# Patient Record
Sex: FEMALE | Race: ASIAN | URBAN - METROPOLITAN AREA
[De-identification: names, ages, dates, MRNs, and addresses within clinical notes are randomized per-mention and may not be internally consistent; named-entity substitution may affect disease eponyms.]

---

## 2017-05-23 ENCOUNTER — OFFICE VISIT (OUTPATIENT)
Dept: FAMILY MEDICINE | Facility: CLINIC | Age: 52
End: 2017-05-23

## 2017-05-23 VITALS
SYSTOLIC BLOOD PRESSURE: 122 MMHG | TEMPERATURE: 98 F | OXYGEN SATURATION: 98 % | DIASTOLIC BLOOD PRESSURE: 78 MMHG | HEART RATE: 96 BPM | WEIGHT: 112.6 LBS | BODY MASS INDEX: 21.45 KG/M2

## 2017-05-23 DIAGNOSIS — R01.1 HEART MURMUR: ICD-10-CM

## 2017-05-23 DIAGNOSIS — N81.2 INCOMPLETE UTERINE PROLAPSE: Primary | ICD-10-CM

## 2017-05-23 NOTE — PATIENT INSTRUCTIONS
For uterus problem:  1. Someone from our clinic will call you tomorrow to set up appointment with the OB/GYN doctor    Follow up at [facility] after your appointment with GYN      Your referral has been scheduled for:    [facility]   [place] [employer] [address], [address]  [place] [id number]  Phone: [phone]    Date: Tomorrow May 25 2017   Time: 7:00 PM Dr Bell    If you have any questions or need to reschedule please call the number listed above.  Any other concerns please call our referral coordinator at [phone]    [facility]  Care Coordinator     GAVE TO ROBIN Cisneros  [phone]

## 2017-05-23 NOTE — PROGRESS NOTES
"       BASILIO Goetz is a 52 year old  female with a PMH significant for:     Patient Active Problem List   Diagnosis     Refugee     Screening examination for pulmonary tuberculosis (TB)     Lipid screening     Restless legs syndrome (RLS)     Vitamin D deficiency     She presents with uterine concerns.    She thinks her uterus is falling out.  She has had issues with her \"uterus falling out\" for the last 20 plus years.  In the past when this would happen, she would \"push it back in\" and the problem would resolve.  However, 1-2 weeks ago, her uterus prolapsed and she has been unable to reduce it.  She notes that it is painful when she tries to reduce it.  She is still able to void and have bowel movements with the prolapsed uterus.  The bowel movement are painful.  At times she experiences some bleeding, but she doesn't report any issues with bleeding today.  She thinks it is time that her uterus be removed.    She had 4 vaginal births in the past and the prolapse started shortly after the birth of her last child.    She is not currently on any medication.    PMH, Medications and Allergies were reviewed and updated as needed.    ROS as above        OBJECTIVE     Vitals:    05/23/17 1649   BP: 122/78   Pulse: 96   Temp: 98  F (36.7  C)   TempSrc: Oral   SpO2: 98%   Weight: 112 lb 9.6 oz (51.1 kg)     Body mass index is 21.45 kg/(m^2).    General: Alert, appropriate, and cooperative.  Appears stated age.  In no acute distress  HEENT:  Atraumatic.  Pupils equal, round, and reactive bilaterally.  Extraocular movements intact.  Mouth shows moist mucous membranes.    Neck:  Supple.  No adenopathy.    CV:  Regular rhythm and rate.  2/6 systolic murmur heard best over right upper sternal border.  Lungs:  Clear to auscultation bilaterally.  No wheezes, rhonchi, or rales.  Abd:  Soft, non-distended, non-tender.  Bowel sounds present.  No masses or organomegaly to palpation.  : Most of the uterus is visible at " vaginal entroitus.  The cervix is parous and there is no evidence of bleeding.  Uterine prolapsed is reduced with minimal effort, but patient endorses pain with reduction.        ASSESSMENT AND PLAN     (N81.2) Incomplete uterine prolapse  (primary encounter diagnosis)  Comment: Patient with 20 years of intermittent uterine prolapse now with worsening of symptoms for the last 2 weeks with difficulty reducing uterus.  Elimination is still okay, and currently denies bleeding.  Uterus reduced in clinic today.  Patient will need surgical consultation for evaluation for possible hysterectomy.  Plan: Urgent OB/GYN referral for evaluation and management of uterine prolapse.  Will route to referral coordinator to set up appointment in the next 1-2 days.    (R01.1) Heart murmur  Comment: Appreciated in clinic today. Did not bring up to patient, but will need follow up at future visit.  Plan: Follow up murmur at next appointment.      RTC after OB/GYN consult for follow up of uterine prolapse or sooner if develops new or worsening symptoms.    Staffed with Dr. Lynch..    Yashira Mckee MD ([place]) PGY-2  [facility]  5/23/2017

## 2017-05-23 NOTE — MR AVS SNAPSHOT
After Visit Summary   5/23/2017    Primitivo Garcia    MRN: [id number]           Patient Information     Date Of Birth          1965        Visit Information        Provider Department      5/23/2017 4:30 PM Yashira Mckee MD [facility]        Today's Diagnoses     Incomplete uterine prolapse    -  1      Care Instructions    For uterus problem:  1. Someone from our clinic will call you tomorrow to set up appointment with the OB/GYN doctor    Follow up at [facility] after your appointment with GYN        Follow-ups after your visit        Additional Services     OB/GYN REFERRAL       Patient prefers to be called    Reason for Referral: Uterine prolapse      needed: Yes  Language: Nepalese    May leave message on voicemail: Yes    ([place] Only) Referral should be tracked (Yes/No)?                  Future tests that were ordered for you today     Open Future Orders        Priority Expected Expires Ordered    OB/GYN REFERRAL Routine  5/23/2018 5/23/2017            Who to contact     Please call your clinic at [phone] to:    Ask questions about your health    Make or cancel appointments    Discuss your medicines    Learn about your test results    Speak to your doctor   If you have compliments or concerns about an experience at your clinic, or if you wish to file a complaint, please contact [facility] Patient Relations at [phone] or email us at [email or web address]         Additional Information About Your Visit        MyChart Information     [employer] is an electronic gateway that provides easy, online access to your medical records. With [employer], you can request a clinic appointment, read your test results, renew a prescription or communicate with your care team.     To sign up for [employer] visit the website at [email or web address]   You will be asked to enter the access code listed below, as well as some personal  information. Please follow the directions to create your username and password.     Your access code is: [email or web address]  Expires: 2017  5:23 PM     Your access code will  in 90 days. If you need help or a new code, please contact your [facility] or call [phone] for assistance.        Care EveryWhere ID     This is your Care EveryWhere ID. This could be used by other organizations to access your [place] medical records  [id number]        Your Vitals Were     Pulse Temperature Pulse Oximetry BMI (Body Mass Index)          96 98  F (36.7  C) (Oral) 98% 21.45 kg/m2         Blood Pressure from Last 3 Encounters:   17 122/78   07/24/15 123/78   07/10/15 114/79    Weight from Last 3 Encounters:   17 112 lb 9.6 oz (51.1 kg)   07/24/15 121 lb 14.4 oz (55.3 kg)   07/10/15 117 lb 4 oz (53.2 kg)                 Today's Medication Changes          These changes are accurate as of: 17  5:23 PM.  If you have any questions, ask your nurse or doctor.               Stop taking these medicines if you haven't already. Please contact your care team if you have questions.     acetaminophen 325 MG tablet   Commonly known as:  TYLENOL   Stopped by:  Yashira Mckee MD           docusate sodium 100 MG tablet   Commonly known as:  COLACE   Stopped by:  Yashira Mckee MD           ferrous sulfate 325 (65 FE) MG tablet   Commonly known as:  IRON   Stopped by:  Yashira Mckee MD           ibuprofen 600 MG tablet   Commonly known as:  ADVIL/MOTRIN   Stopped by:  Yashira Mckee MD           methylcellulose (laxative) Powd   Stopped by:  Yashira Mckee MD           multivitamin, therapeutic with minerals Tabs tablet   Stopped by:  Yashira Mckee MD           vitamin D 2000 UNITS tablet   Stopped by:  Yashira Mckee MD                    Primary Care Provider Office Phone # Fax #    Radha Lynch MD  [phone]       [facility] [address] [place] [address]        Thank you!     Thank you for choosing [facility]  for your care. Our goal is always to provide you with excellent care. Hearing back from our patients is one way we can continue to improve our services. Please take a few minutes to complete the written survey that you may receive in the mail after your visit with us. Thank you!             Your Updated Medication List - Protect others around you: Learn how to safely use, store and throw away your medicines at www.disposemymeds.org.      Notice  As of 5/23/2017  5:23 PM    You have not been prescribed any medications.

## 2017-05-23 NOTE — Clinical Note
Ct or Tarah, Will you please schedule this patient for OB/GYN consult in the next 1-2 days please? She will need a [place] Island[place] . Thanks! PESTEVO

## 2017-05-24 NOTE — PROGRESS NOTES
Preceptor attestation:  Vital signs reviewed: /78  Pulse 96  Temp 98  F (36.7  C) (Oral)  Wt 112 lb 9.6 oz (51.1 kg)  SpO2 98%  BMI 21.45 kg/m2    Patient seen and discussed with the resident. Assessment and plan reviewed with resident and agreed upon.    Supervising physician: Radha Lynch MD  [facility]

## 2017-06-12 ENCOUNTER — OFFICE VISIT (OUTPATIENT)
Dept: FAMILY MEDICINE | Facility: CLINIC | Age: 52
End: 2017-06-12

## 2017-06-12 VITALS
OXYGEN SATURATION: 98 % | WEIGHT: 110 LBS | TEMPERATURE: 98.1 F | DIASTOLIC BLOOD PRESSURE: 82 MMHG | SYSTOLIC BLOOD PRESSURE: 127 MMHG | HEART RATE: 73 BPM | HEIGHT: 61 IN | BODY MASS INDEX: 20.77 KG/M2

## 2017-06-12 DIAGNOSIS — Z01.818 PREOP GENERAL PHYSICAL EXAM: Primary | ICD-10-CM

## 2017-06-12 LAB
ANION GAP SERPL CALCULATED.3IONS-SCNC: 11 MMOL/L (ref 5–18)
BUN SERPL-MCNC: 9 MG/DL (ref 8–22)
CALCIUM SERPL-MCNC: 9.5 MG/DL (ref 8.5–10.5)
CHLORIDE SERPL-SCNC: 100 MMOL/L (ref 98–107)
CO2 SERPL-SCNC: 27 MMOL/L (ref 22–31)
CREAT SERPL-MCNC: 0.68 MG/DL (ref 0.6–1.1)
GLUCOSE SERPL-MCNC: 98 MG/DL (ref 70–125)
HEMOGLOBIN: 14.4 G/DL (ref 11.7–15.7)
POTASSIUM SERPL-SCNC: 4 MMOL/L (ref 3.5–5)
SODIUM SERPL-SCNC: 138 MMOL/L (ref 136–145)

## 2017-06-12 NOTE — PROGRESS NOTES
[address] [address]  Phone: [phone]  Fax: [phone]    6/12/2017    Adult PRE-OP Evaluation:    Primitivo Garcia, 1965 presents for pre-operative evaluation and assessment as requested by Dr. Bell, prior to undergoing surgery/procedure for treatment of  Uterine prolapse .    Proposed procedure: colpocleisis    Date of Surgery/ Procedure: 6/14/17  Hospital/Surgical Facility: [facility], Fax: [phone]     Primary Physician: Radha Lynch  Type of Anesthesia Anticipated: to be determined  History of anesthesia complications: NONE  History of  abnormal bleeding: NONE   History of blood transfusions: NO  Patient has a Health Care Directive or Living Will:  NO    Preoperative Questions   1. NO - Do you have a history of heart attack, stroke, stent, bypass or surgery on an artery in the head, neck, heart or legs?  2. NO - Do you ever have any pain or discomfort in your chest?  3. NO - Have you ever had a severe pain across the front of your chest lasting for half an hour or more?  4. NO - Do you have a history of Congestive Heart Failure?  5. NO - Are you troubled by shortness of breath when: walking on the level/ up a slight hill/ at night?  6. NO - Does your chest ever sound wheezy or whistling?  7. NO - Do you currently have a cold, bronchitis or other respiratory infection?  8. NO - Have you had a cold, bronchitis or other respiratory infection within the last 2 weeks?  9. NO - Do you usually have a cough?  10. Yes - once in a while - Do you sometimes get pains in the calves of your legs when you walk?  11. NO - Do you or anyone in your family have previous history of blood clots?  12. NO - Do you or does anyone in your family have a serious bleeding problem such as prolonged bleeding following surgeries or cuts?  13. NO - Have you ever had problems with anemia or been told to take iron pills?  14. NO - Have you had any abnormal blood loss such as black, tarry or  "bloody stools, or abnormal vaginal bleeding?  15. NO - Have you ever had a blood transfusion?  16. NO - Have you or any of your relatives ever had problems with anesthesia?  17. NO - Do you have sleep apnea, excessive snoring or daytime drowsiness?  18. NO - Do you have any prosthetic heart valves?  19. NO - Do you have prosthetic joints?  20. NO - Is there any chance that you may be pregnant?    Patient Active Problem List   Diagnosis     Refugee     Screening examination for pulmonary tuberculosis (TB)     Lipid screening     Restless legs syndrome (RLS)     Vitamin D deficiency     Heart murmur       No current outpatient prescriptions on file prior to visit.  No current facility-administered medications on file prior to visit.     OTC products: None, except as noted above    Allergies   Allergen Reactions     Nka [No Known Allergies]      Latex Allergy: NO    Social: Non-smoker.    REVIEW OF SYSTEMS:   Constitutional, HEENT, cardiovascular, pulmonary, gi and gu systems are negative, except as otherwise noted.    EXAM:   Patient Vitals for the past 24 hrs:   BP Temp Pulse SpO2 Height Weight   06/12/17 0935 127/82 98.1  F (36.7  C) 73 98 % 5' 0.63\" (154 cm) 110 lb (49.9 kg)     Body mass index is 21.04 kg/(m^2).  GENERAL: healthy, alert and no distress  HENT: Nose- normal; Mouth- no ulcers, no lesions  NECK: no tenderness, no adenopathy, no asymmetry, no masses, no stiffness; thyroid- normal to palpation  RESP: lungs clear to auscultation - no rales, no rhonchi, no wheezes  CV: regular rates and rhythm, normal S1 S2, no S3 or S4 and no murmur, no click or rub -  MS: extremities- no gross deformities noted, no edema  PSYCH: Alert and oriented times 3; speech- coherent , normal rate and volume; able to articulate logical thoughts    DIAGNOSTICS:      Results for orders placed or performed in visit on 06/12/17   Basic Metabolic Profile ([facility])   Result Value Ref Range    Sodium 138 136 - 145 mmol/L    Potassium " 4.0 3.5 - 5.0 mmol/L    Chloride 100 98 - 107 mmol/L    CO2, Total 27 22 - 31 mmol/L    Anion Gap 11 5 - 18 mmol/L    Glucose 98 70 - 125 mg/dL    Calcium 9.5 8.5 - 10.5 mg/dL    Urea Nitrogen 9 8 - 22 mg/dL    Creatinine 0.68 0.60 - 1.10 mg/dL    GFR Estimate If Black >60 >60 mL/min/1.73m2    GFR Estimate >60 >60 mL/min/1.73m2    Narrative    Test performed by:  [facility]  [address] [place] [address]  Fasting Glucose reference range is 70-99 mg/dL per  American Diabetes Association (ADA) guidelines.   Hemoglobin (HGB) ([facility])   Result Value Ref Range    Hemoglobin 14.4 11.7 - 15.7 g/dL     EKG: NSR.  HR 65.  QTc 453.  T-wave inversion leads V1 through V4.    RISK ASSESSMENT:     Cardiovascular Risk:  -Patient is able to do heavy housework without chest pain.  -The patient does not have chest pain with exertion.  -Patient does not have a history of congestive heart failure.    -The patient does not have a history of stroke and does not have a history of valvular disease.    Pulmonary Risk:  -In terms of risk factors for pulmonary complication, the patient does not have any.    Perioperative Complications:  -The patient does not have a history of bleeding or clotting problems in the past.    -The patient has not had complications from surgeries.    -The patient does not have a family history of any anesthesia or surgical complications.      IMPRESSION:   Reason for surgery/procedure: uterine prolapse    The proposed surgical procedure is considered INTERMEDIATE risk.    For above listed surgery and anesthesia:   Patient is at low risk for surgery/procedure and perioperative/procedure complications.    RECOMMENDATIONS:     Labs: Normal BMP and hemoglobin    Fasting:  NPO for 12 hours prior to surgery    Preop Plan:  --Approval given to proceed with proposed procedure, without further diagnostic evaluation    Medications:  Not currently taking any.    Radha Lynch MD    Please contact  our office if there are any further questions or information required about this patient.

## 2017-06-12 NOTE — NURSING NOTE
name: Earnest Ames  Language: Citizen of [place]  Agency: [employer]  Phone number: [phone]

## 2017-06-12 NOTE — MR AVS SNAPSHOT
After Visit Summary   6/12/2017    Primitivo Garcia    MRN: [id number]           Patient Information     Date Of Birth          1965        Visit Information        Provider Department      6/12/2017 9:20 AM Radha Lynch MD [facility]        Today's Diagnoses     Preop general physical exam    -  1      Care Instructions      Presurgery Checklist  You are scheduled to have surgery. The healthcare staff will try to make your stay comfortable. Use the guidelines below to remind yourself what to do before surgery. Be sure to follow any specific pre-op instructions from your surgeon or nurse.   Preparing for Surgery  Ask your surgeon if you ll need a blood transfusion during surgery and if so, how to prepare for it. In some cases, you can donate blood before surgery. If needed, this blood can be given back (transfused) to you during or after surgery.  If you are having abdominal surgery, ask what you need to do to clear your bowel.  Tell your surgeon if you have allergies to any medications or foods.  Arrange for an adult family member or friend to drive you home after surgery. If possible, have someone ready to help you at home as you recover.  Call the surgeon if you get a cold, fever, sore throat, diarrhea, or other health problem just before surgery. Your surgeon can decide whether or not to postpone the surgery.  Medications  Tell your surgeon about all medications you take, including prescription and over-the-counter products such as herbal remedies and vitamins. Ask if you should continue taking them.  If you take ibuprofen, naproxen, or  blood thinners  such as aspirin, clopidogrel (Plavix), or warfarin (Coumadin), ask your surgeon whether you should stop taking them and how long before surgery you should stop.  You may be told to take antibiotics just before surgery to prevent infection. If so, follow instructions carefully on how to take them.  If you are told to take medications called  anticoagulants to prevent blood clots after surgery, be sure to follow the instructions on how to take them.  Stop Smoking  If you smoke, healing may take longer. So at least 2 week(s) before surgery, stop smoking.  Bathing or Showering Before Surgery  If instructed, wash with antibacterial soap. Afterward, do not use lotions or powders.  If you are having surgery on the head, you may be asked to shampoo with antibacterial soap. Follow instructions for doing so.  Do Not Remove Hair from the Surgery Site  Do not shave hair from the incision site, unless you are given specific instructions to do so. Usually, if hair needs to be removed, it will be done at the hospital right before surgery.  Don t Eat or Drink  Your doctor will tell you when to stop eating and drinking. If you do not follow your doctor's instructions, your procedure may be postponed or rescheduled for another day.  If your surgeon tells you to continue any medications, take them with small sips of water.  You can brush your teeth and rinse your mouth, but don t swallow any water.  Day of Surgery  Do not wear makeup. Do not use perfume, deodorant, or hairspray. Remove nail polish and artificial nails.  Leave jewelry (including rings), watches, and other valuables at home.  Be sure to bring health insurance cards or forms and a photo ID.  Bring a list of your medications (include the name, dose, how often you take them, and the time last dose was taken).  Arrive on time at the hospital or surgery facility.          Follow-ups after your visit        Who to contact     Please call your clinic at [phone] to:    Ask questions about your health    Make or cancel appointments    Discuss your medicines    Learn about your test results    Speak to your doctor   If you have compliments or concerns about an experience at your clinic, or if you wish to file a complaint, please contact [facility] Patient Relations at [phone] or  "email us at [email or web address]         Additional Information About Your Visit        [employer] Information     [employer] is an electronic gateway that provides easy, online access to your medical records. With [employer], you can request a clinic appointment, read your test results, renew a prescription or communicate with your care team.     To sign up for [employer] visit the website at [email or web address]   You will be asked to enter the access code listed below, as well as some personal information. Please follow the directions to create your username and password.     Your access code is: [email or web address]  Expires: 2017  5:23 PM     Your access code will  in 90 days. If you need help or a new code, please contact your [facility] or call [phone] for assistance.        Care EveryWhere ID     This is your Care EveryWhere ID. This could be used by other organizations to access your [place] medical records  [id number]        Your Vitals Were     Pulse Temperature Height Pulse Oximetry Breastfeeding? BMI (Body Mass Index)    73 98.1  F (36.7  C) 5' 0.63\" (154 cm) 98% No 21.04 kg/m2       Blood Pressure from Last 3 Encounters:   17 127/82   17 122/78   07/24/15 123/78    Weight from Last 3 Encounters:   17 110 lb (49.9 kg)   17 112 lb 9.6 oz (51.1 kg)   07/24/15 121 lb 14.4 oz (55.3 kg)              We Performed the Following     Basic Metabolic Profile ([facility])     EKG 12-lead complete w/read - Clinics     Hemoglobin (HGB) ([facility])        Primary Care Provider Office Phone # Fax #    Radha Lynch [phone]       [facility] [address] [place] [address]        Thank you!     Thank you for choosing [facility]  for your care. Our goal is always to provide you with excellent care. Hearing back from our patients is one way we can continue to improve our services. Please take a few " minutes to complete the written survey that you may receive in the mail after your visit with us. Thank you!             Your Updated Medication List - Protect others around you: Learn how to safely use, store and throw away your medicines at www.disposemymeds.org.      Notice  As of 6/12/2017 11:59 PM    You have not been prescribed any medications.

## 2017-06-13 ENCOUNTER — TELEPHONE (OUTPATIENT)
Dept: FAMILY MEDICINE | Facility: CLINIC | Age: 52
End: 2017-06-13

## 2017-06-13 ASSESSMENT — MIFFLIN-ST. JEOR: SCORE: 1008.4

## 2017-06-13 NOTE — TELEPHONE ENCOUNTER
Called interpretor, asked Earnest Ames to call the patient and let her know she can have surgery her labs are normal.

## 2017-06-14 ENCOUNTER — SURGERY - HEALTHEAST (OUTPATIENT)
Dept: SURGERY | Facility: HOSPITAL | Age: 52
End: 2017-06-14

## 2017-06-14 ENCOUNTER — ANESTHESIA - HEALTHEAST (OUTPATIENT)
Dept: SURGERY | Facility: HOSPITAL | Age: 52
End: 2017-06-14

## 2017-06-14 ASSESSMENT — MIFFLIN-ST. JEOR: SCORE: 1083.92

## 2018-02-11 ENCOUNTER — HEALTH MAINTENANCE LETTER (OUTPATIENT)
Age: 53
End: 2018-02-11

## 2019-04-19 ENCOUNTER — OFFICE VISIT (OUTPATIENT)
Dept: FAMILY MEDICINE | Facility: CLINIC | Age: 54
End: 2019-04-19
Payer: COMMERCIAL

## 2019-04-19 VITALS
WEIGHT: 116 LBS | OXYGEN SATURATION: 97 % | HEART RATE: 75 BPM | RESPIRATION RATE: 16 BRPM | TEMPERATURE: 98 F | DIASTOLIC BLOOD PRESSURE: 77 MMHG | BODY MASS INDEX: 22.19 KG/M2 | SYSTOLIC BLOOD PRESSURE: 120 MMHG

## 2019-04-19 DIAGNOSIS — R53.83 FATIGUE, UNSPECIFIED TYPE: Primary | ICD-10-CM

## 2019-04-19 DIAGNOSIS — M62.838 MUSCLE SPASM: ICD-10-CM

## 2019-04-19 DIAGNOSIS — Z12.11 SCREENING FOR COLON CANCER: ICD-10-CM

## 2019-04-19 LAB
ERYTHROCYTE [DISTWIDTH] IN BLOOD BY AUTOMATED COUNT: 12.2 % (ref 11–14.5)
HCT VFR BLD AUTO: 40.9 % (ref 35–47)
HGB BLD-MCNC: 13.8 G/DL (ref 12–16)
MCH RBC QN AUTO: 29.9 PG (ref 27–34)
MCHC RBC AUTO-ENTMCNC: 33.7 G/DL (ref 32–36)
MCV RBC AUTO: 89 FL (ref 80–100)
PLATELET # BLD AUTO: 201 THOU/UL (ref 140–440)
PMV BLD AUTO: 10.3 FL (ref 8.5–12.5)
RBC # BLD AUTO: 4.62 MILL/UL (ref 3.8–5.4)
TSH SERPL DL<=0.05 MIU/L-ACNC: 0.85 UIU/ML (ref 0.3–5)
WBC # BLD AUTO: 7.7 THOU/UL (ref 4–11)

## 2019-04-19 RX ORDER — CYCLOBENZAPRINE HCL 10 MG
10 TABLET ORAL
Qty: 30 TABLET | Refills: 0 | Status: SHIPPED | OUTPATIENT
Start: 2019-04-19

## 2019-04-19 NOTE — PATIENT INSTRUCTIONS
4/19/2019  Primitivo Garcia    It was a pleasure to see you today at [facility].     My recommendations after this visit include:   1. Return in 2 weeks for recheck and to discuss results  2. Try medication at night time for neck pain and sleep    Thank you for allowing me to be a part of your health care team!    Sincerely,   Dr. Singh

## 2019-04-19 NOTE — PROGRESS NOTES
S: Primitivo Garcia is a 54 year old female with a PMH of   Patient Active Problem List   Diagnosis     Refugee     Screening examination for pulmonary tuberculosis (TB)     Lipid screening     Restless legs syndrome (RLS)     Vitamin D deficiency     Heart murmur     presenting to clinic today with a chief complaint of headaches. She reports worse with heat. She states she also had dizziness and fainting. She reports decreased sleep which leads to decreased eating which leads to feeling faint. This has been ongoing for over 16 years. She reports this gets better when stretching, massage and traditional medicine. Worse with lack of sleep. She has not tried any medicine to sleep. Only pain medicine for headaches. Headaches are also with these symptoms. She reports she is missing work 2x due to this in 2 months.       ROS:  General: No fevers, chills. No weight loss.   Head: reports headache.     CV: Reports palpitations.  Resp: reports shortness of breath.    GI: No nausea, vomiting, constipation, diarrhea  : No vaginal bleeding. No menses.     Current Outpatient Medications   Medication Sig Dispense Refill     cyclobenzaprine (FLEXERIL) 10 MG tablet Take 1 tablet (10 mg) by mouth nightly as needed for muscle spasms 30 tablet 0       O: /77   Pulse 75   Temp 98  F (36.7  C) (Oral)   Resp 16   Wt 52.6 kg (116 lb)   SpO2 97%   BMI 22.19 kg/m     Gen:  Well nourished and in No acute distress   Neck: supple without lymphadenopathy  CV:  RRR  - no murmurs noted   Pulm:  CTAB, no wheezes or crackles noted, good air entry   Extrem: no cyanosis, edema or clubbing  Psych: Euthymic      Assessment and Plan:  Primitivo was seen today for dizziness.    Diagnoses and all orders for this visit:    Fatigue, unspecified type  -     CBC w/ Plt. ([facility])  -     Thyroid Martin ([facility])  -     Vitamin D 25-Hydroxy ([facility])    Muscle spasm  -     cyclobenzaprine (FLEXERIL) 10 MG tablet; Take 1 tablet (10 mg) by mouth nightly  as needed for muscle spasms    Screening for colon cancer  -     Fecal Occult Blood - FIT, iFOB (P ); Future    Comment: due to non-specific complaints which start with difficulty sleeping leading to symptoms concerning for tension headaches, will start with muscle relaxant for neck and sleep. Patient will return to discuss labs in 2 weeks with  and sooner if new concerns. Due to Hx of heart murmur (not heard on exam today) I will hold off on ECHO however this is also a potential cause of fatigue.    This patient was seen and discussed with Dr. Romo who agrees with the assessment and plan.     Eloisa Singh, DO  PGY3

## 2019-04-19 NOTE — PROGRESS NOTES
Preceptor Attestation:   Patient seen, evaluated and discussed with the resident. I have verified the content of the note, which accurately reflects my assessment of the patient and the plan of care.   Supervising Physician:  Elham Romo MD

## 2019-04-19 NOTE — NURSING NOTE
Due to patient being non-English speaking/uses sign language, an  was used for this visit. Only for face-to-face interpretation by an external agency, date and length of interpretation can be found on the scanned worksheet.     name: Earnest Ames  Agency: [employer]  Language: Cele   Telephone number: [phone]  Type of interpretation: Face-to-face, spoken

## 2019-04-22 LAB — 25(OH)D3 SERPL-MCNC: 6.6 NG/ML (ref 30–80)

## 2019-04-25 NOTE — RESULT ENCOUNTER NOTE
Patient has follow up appointment to discuss results. She will need 50,000 U for 8 weeks and plan to recheck level at that time. Will discuss in person at follow up as previously planned.   Eloisa Singh,   PGY3

## 2019-05-02 ENCOUNTER — OFFICE VISIT (OUTPATIENT)
Dept: FAMILY MEDICINE | Facility: CLINIC | Age: 54
End: 2019-05-02
Payer: COMMERCIAL

## 2019-05-02 VITALS
TEMPERATURE: 97.6 F | WEIGHT: 120 LBS | BODY MASS INDEX: 22.95 KG/M2 | SYSTOLIC BLOOD PRESSURE: 117 MMHG | OXYGEN SATURATION: 97 % | RESPIRATION RATE: 18 BRPM | DIASTOLIC BLOOD PRESSURE: 72 MMHG | HEART RATE: 82 BPM

## 2019-05-02 DIAGNOSIS — G47.00 INSOMNIA, UNSPECIFIED TYPE: ICD-10-CM

## 2019-05-02 DIAGNOSIS — E55.9 VITAMIN D DEFICIENCY: Primary | ICD-10-CM

## 2019-05-02 RX ORDER — ERGOCALCIFEROL 1.25 MG/1
50000 CAPSULE, LIQUID FILLED ORAL WEEKLY
Qty: 8 CAPSULE | Refills: 0 | Status: SHIPPED | OUTPATIENT
Start: 2019-05-02 | End: 2019-07-24

## 2019-05-02 RX ORDER — LANOLIN ALCOHOL/MO/W.PET/CERES
3 CREAM (GRAM) TOPICAL
Qty: 90 TABLET | Refills: 3 | Status: SHIPPED | OUTPATIENT
Start: 2019-05-02 | End: 2019-07-11

## 2019-05-02 NOTE — PROGRESS NOTES
S: Primitivo Garcia is a 54 year old female with a PMH of   Patient Active Problem List   Diagnosis     Refugee     Screening examination for pulmonary tuberculosis (TB)     Lipid screening     Restless legs syndrome (RLS)     Vitamin D deficiency     Heart murmur     presenting to clinic today with a chief complaint of following up for lab results and headaches. She reports she has had improvement in headache and overall symptoms with improvement of approximately 50%.  Patient reports the symptoms have been ongoing for the last 16 years and is happy to report final improvement.  Patient reports she is still having some difficulty with sleeping. She works days and reports decreased energy and fatigue due to difficulty with staying asleep at night.  Patient reports she tries to go to bed around 9 or 10 PM however sometimes has difficulty falling asleep until around 1 AM.  Patient states she is at work around 6 AM and therefore is not getting much sleep.       ROS:  General: No fevers, chills  Head: Reports headache.  No vision changes.    Current Outpatient Medications   Medication Sig Dispense Refill     melatonin 3 MG tablet Take 1 tablet (3 mg) by mouth nightly as needed for sleep 90 tablet 3     vitamin D2 (ERGOCALCIFEROL) 16632 units (1250 mcg) capsule Take 1 capsule (50,000 Units) by mouth once a week 8 capsule 0     cyclobenzaprine (FLEXERIL) 10 MG tablet Take 1 tablet (10 mg) by mouth nightly as needed for muscle spasms (Patient not taking: Reported on 5/2/2019) 30 tablet 0       O: /72   Pulse 82   Temp 97.6  F (36.4  C) (Oral)   Resp 18   Wt 54.4 kg (120 lb)   SpO2 97%   BMI 22.95 kg/m     Gen:  Well nourished and in No acute distress   Psych: Euthymic      Assessment and Plan:  Primitivo was seen today for results.    Diagnoses and all orders for this visit:    Vitamin D deficiency  -     vitamin D2 (ERGOCALCIFEROL) 17052 units (1250 mcg) capsule; Take 1 capsule (50,000 Units) by mouth once a  week    Insomnia, unspecified type  -     melatonin 3 MG tablet; Take 1 tablet (3 mg) by mouth nightly as needed for sleep    Comment: Discussed results of patient's lab results with her today.  Due to low vitamin D level, patient will be started on 50,000 units once weekly.  Patient will need lifelong supplementation and would benefit from vitamin D and calcium supplementation in the future.  Plan to recheck patient's vitamin D level in 8 weeks and if needed continue weekly supplementation.  At that time a we will also start daily 2000 units vitamin D in addition to calcium supplementation.      This patient was seen and discussed with Dr. Welch who agrees with the assessment and plan.     Eloisa Singh, DO  PGY3

## 2019-05-02 NOTE — PATIENT INSTRUCTIONS
5/2/2019  Primitivo Garcia    It was a pleasure to see you today at [facility].     My recommendations after this visit include:   1. Return in 8 weeks for vitamin D recheck; if it is still low we may continue the high dose Vitamin D.   2. Melatonin for sleep  3. Continue Flexeril as needed for neck pain    Thank you for allowing me to be a part of your health care team!    Sincerely,   Dr. Singh

## 2019-05-02 NOTE — NURSING NOTE
Due to patient being non-English speaking/uses sign language, an  was used for this visit. Only for face-to-face interpretation by an external agency, date and length of interpretation can be found on the scanned worksheet.     name: Earnest Ames  Agency: [employer]  Language: Cele   Telephone number: [phone]  Type of interpretation: Face-to-face, spoken

## 2019-05-05 NOTE — PROGRESS NOTES
Preceptor Attestation:   Patient seen, evaluated and discussed with the resident. I have verified the content of the note, which accurately reflects my assessment of the patient and the plan of care.   Supervising Physician:  Emiliano Welch MD

## 2019-07-11 ENCOUNTER — OFFICE VISIT (OUTPATIENT)
Dept: FAMILY MEDICINE | Facility: CLINIC | Age: 54
End: 2019-07-11
Payer: COMMERCIAL

## 2019-07-11 VITALS
HEIGHT: 60 IN | BODY MASS INDEX: 22.5 KG/M2 | RESPIRATION RATE: 18 BRPM | WEIGHT: 114.6 LBS | SYSTOLIC BLOOD PRESSURE: 151 MMHG | OXYGEN SATURATION: 96 % | TEMPERATURE: 98.1 F | DIASTOLIC BLOOD PRESSURE: 90 MMHG | HEART RATE: 74 BPM

## 2019-07-11 DIAGNOSIS — G47.00 INSOMNIA, UNSPECIFIED TYPE: ICD-10-CM

## 2019-07-11 DIAGNOSIS — E55.9 VITAMIN D DEFICIENCY: ICD-10-CM

## 2019-07-11 RX ORDER — ERGOCALCIFEROL 1.25 MG/1
50000 CAPSULE, LIQUID FILLED ORAL WEEKLY
Qty: 8 CAPSULE | Refills: 0 | Status: CANCELLED | OUTPATIENT
Start: 2019-07-11

## 2019-07-11 ASSESSMENT — MIFFLIN-ST. JEOR: SCORE: 1038.19

## 2019-07-11 NOTE — PATIENT INSTRUCTIONS
Dear Primitivo Garcia,    It was a pleasure seeing you in clinic today.   Try higher dose of melatonin.  Exercise for at least 30 minutes daily  Eat 4-6 small meals a day.   Drink more water.  Cut back on betel nut use to 0-1x a day.  Follow up in 2 weeks.    Please do not hesitate to call or return to clinic if you have an questions or concerns.      Sincerely,    Dr. Duarte

## 2019-07-11 NOTE — PROGRESS NOTES
Preceptor Attestation:   Patient seen, evaluated and discussed with the resident. I have verified the content of the note, which accurately reflects my assessment of the patient and the plan of care.   Supervising Physician:  Maulik Tovar MD

## 2019-07-11 NOTE — PROGRESS NOTES
"SUBJECTIVE  HPI: Primitivo Garcia is a 54 year old female who presents for follow up. States that she is taking vitamin D tablets for vitamin D deficiency. Reports she took the tablets once a week for 8 weeks.     She was also prescribed melatonin 3 mg tablets at her previous visit for insomnia. She took a month supply and it was only somewhat helpful. Reports she has difficulty falling asleep and staying asleep. 18 years duration. Would like to try an increased dose of melatonin.    Dizziness. Had one episode of dizziness that occurred last week. Lasted for whole day. Felt lightheaded. She went home and rested for two days.  Lightheadedness resolved with eating and drinking and rest. Reports she had a similar episode several months ago. Denies any presyncope or headaches.    Chews betel nut 4 times a day.    Denies depressed mood or anxiety.     ROS: per HPI.     PMH:   Patient Active Problem List    Diagnosis Date Noted     Heart murmur 05/23/2017     Priority: Medium     2/6 systolic murmur heard on exam 5/23/16. Did not discuss with patient.       Restless legs syndrome (RLS) 07/24/2015     Priority: Medium     Vitamin D deficiency 07/24/2015     Priority: Medium     Lipid screening 12/08/2014     Priority: Medium     June 2014: , ASCVD 10-year risk 2.2% -- plan to screen annually for >190       Screening examination for pulmonary tuberculosis (TB) 08/25/2014     Priority: Medium     Sent to TB clinic for Class B condition on refugee exam.  Negative QFT.  Per TB clinic, no need for follow-up.       Refugee 05/29/2014     Priority: Medium     Arrived 04/30/14 from [facility].         Social Hx:  Social History     Social History Narrative     Not on file     History   Smoking Status     Never Smoker   Smokeless Tobacco     Current User     Types: Chew       OBJECTIVE:  Vitals: /90   Pulse 74   Temp 98.1  F (36.7  C) (Oral)   Resp 18   Ht 1.535 m (5' 0.43\")   Wt 52 kg (114 lb 9.6 oz)   SpO2 96%   BMI " 22.06 kg/m    General: Pleasant. Middle-aged woman. No distress.  HEENT: Moist mucous membranes. Extraocular movements intact. Sclera non-injected. Pupils equal, round, and reactive to light.  Heart: Regular rate and rhythm. No murmurs, rubs, or gallops.  Extremities: Extremities warm and well-perfused.  Lungs: Clear to auscultation bilaterally. Good air movement.  Neuro: Alert and oriented x3. CN II-XII intact. No focal deficits.      ASSESSMENT & PLAN:    Primitivo was seen today for recheck.    Diagnoses and all orders for this visit:    Insomnia, unspecified type: Will increase melatonin from 3 to 5 mg.  Discussed sleep hygiene at length with patient.  Encouraged betel nut cessation today.  -     melatonin 5 MG tablet; Take 1 tablet (5 mg) by mouth nightly as needed for sleep    Vitamin D deficiency: Vitamin D level was 6.6 in April, status post oral replacement.  Will recheck level today.  -     Vitamin D 25-Hydroxy ([facility])    Patient reported episode of dizziness that has since resolved.  No red flags.  Normal exam today.  Encouraged good p.o. intake with increased water and eating frequent small meals as well as betel not cessation.  Will monitor.      Orders Placed This Encounter   Procedures     Vitamin D 25-Hydroxy ([facility])       Return to clinic in 2 weeks for follow-up or sooner if needed.      The patient was actively involved in the decision making process, and all the questions were answered to their satisfaction prior to leaving.       Patient precepted with attending physician, Dr. Tovar.    Carolina Duarte,    PGY-3 [facility]  Pager: [phone]

## 2019-07-11 NOTE — NURSING NOTE
Due to patient being non-English speaking/uses sign language, an  was used for this visit. Only for face-to-face interpretation by an external agency, date and length of interpretation can be found on the scanned worksheet.     name: Earnest Ames  Agency: [employer]  Language: Cele   Telephone number: [phone]  Type of interpretation: Face-to-face, spoken

## 2019-07-12 LAB — 25(OH)D3 SERPL-MCNC: 28.7 NG/ML (ref 30–80)

## 2019-07-24 DIAGNOSIS — E55.9 VITAMIN D INSUFFICIENCY: Primary | ICD-10-CM

## 2019-07-24 RX ORDER — CHOLECALCIFEROL (VITAMIN D3) 50 MCG
1 TABLET ORAL DAILY
Qty: 30 TABLET | Refills: 3 | Status: SHIPPED | OUTPATIENT
Start: 2019-07-24

## 2019-07-24 NOTE — RESULT ENCOUNTER NOTE
Please call patient via  with results.     Lang[place],    Your vitamin D level improved but remains slightly low. I have sent you a prescription for vitamin D tablets to take once daily.     Please call the clinic with any questions or concerns.    Sincerely,    Dr. Duarte

## 2019-07-25 ENCOUNTER — OFFICE VISIT (OUTPATIENT)
Dept: FAMILY MEDICINE | Facility: CLINIC | Age: 54
End: 2019-07-25
Payer: COMMERCIAL

## 2019-07-25 VITALS
HEART RATE: 76 BPM | SYSTOLIC BLOOD PRESSURE: 114 MMHG | OXYGEN SATURATION: 96 % | TEMPERATURE: 98.3 F | WEIGHT: 115.6 LBS | RESPIRATION RATE: 16 BRPM | BODY MASS INDEX: 22.7 KG/M2 | DIASTOLIC BLOOD PRESSURE: 72 MMHG | HEIGHT: 60 IN

## 2019-07-25 DIAGNOSIS — S46.819A STRAIN OF TRAPEZIUS MUSCLE, UNSPECIFIED LATERALITY, INITIAL ENCOUNTER: ICD-10-CM

## 2019-07-25 DIAGNOSIS — L85.3 XEROSIS CUTIS: Primary | ICD-10-CM

## 2019-07-25 RX ORDER — SIMVASTATIN 20 MG
20 TABLET ORAL DAILY
COMMUNITY

## 2019-07-25 RX ORDER — CAPSAICIN 0.025 %
CREAM (GRAM) TOPICAL
Qty: 60 G | Refills: 1 | Status: SHIPPED | OUTPATIENT
Start: 2019-07-25

## 2019-07-25 ASSESSMENT — MIFFLIN-ST. JEOR: SCORE: 1039.04

## 2019-07-25 NOTE — PROGRESS NOTES
Preceptor Attestation:   Patient seen, evaluated and discussed with the resident. I have verified the content of the note, which accurately reflects my assessment of the patient and the plan of care.   Supervising Physician:  Jean Marie Brooke MD

## 2019-07-25 NOTE — PATIENT INSTRUCTIONS
Dear Primitivo Garcia,    It was a pleasure seeing you in clinic today. Please do not hesitate to call or return to clinic if you have an questions or concerns.      Sincerely,    Dr. Duarte

## 2019-07-25 NOTE — PROGRESS NOTES
"SUBJECTIVE  HPI: Primitivo Garcia is a 54 year old female who presents for follow up to discuss results.     Discussed vitamin D level. Discussed new prescription for vitamin D. Plan to take daily.    Melatonin has been working well. No sleep issues currently.     Also reports itching on arms, legs and back. Usually bothersome most at night. Does not use any lotions. She applies herbs that she rubs onto her arms and legs at night.    Reports pain in neck and shoulders. 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 Reports pain has been present for 3 years, intermittently but has worsened lately.     Denies numbness, tingling. Denies weakness. Denies fevers or chills.     ROS: per HPI.     PMH:   Patient Active Problem List    Diagnosis Date Noted     Xerosis cutis 07/25/2019     Priority: Medium     Heart murmur 05/23/2017     Priority: Medium     2/6 systolic murmur heard on exam 5/23/16. Did not discuss with patient.       Restless legs syndrome (RLS) 07/24/2015     Priority: Medium     Vitamin D deficiency 07/24/2015     Priority: Medium     Lipid screening 12/08/2014     Priority: Medium     June 2014: , ASCVD 10-year risk 2.2% -- plan to screen annually for >190       Screening examination for pulmonary tuberculosis (TB) 08/25/2014     Priority: Medium     Sent to TB clinic for Class B condition on refugee exam.  Negative QFT.  Per TB clinic, no need for follow-up.       Refugee 05/29/2014     Priority: Medium     Arrived 04/30/14 from [facility].         Social Hx:  Social History     Social History Narrative     Not on file     History   Smoking Status     Never Smoker   Smokeless Tobacco     Current User     Types: Chew       OBJECTIVE:  Vitals: /72 (BP Location: Right arm, Patient Position: Sitting)   Pulse 76   Temp 98.3  F (36.8  C) (Oral)   Resp 16   Ht 1.529 m (5' 0.2\")   Wt 52.4 kg (115 lb 9.6 oz)   SpO2 96%   BMI 22.43 kg/m    General: " Pleasant. Middle-aged woman. No distress.  Heart: Regular rate and rhythm. No murmurs, rubs, or gallops.  Extremities: Extremities warm and well-perfused.  Lungs: Clear to auscultation bilaterally. Good air movement.  MSK: Neck normal to inspection.  Normal range of motion.  No tenderness over cervical spinous processes.  Mild tenderness to palpation over trapezius, bilaterally.  Back normal to inspection, no tenderness over thoracic spinous processes.  Neuro: No focal deficits.  Skin: Dry skin noted diffusely.    Results for orders placed or performed in visit on 07/11/19   Vitamin D 25-Hydroxy ([facility])   Result Value Ref Range    Vitamin D,25-Hydroxy 28.7 (L) 30.0 - 80.0 ng/mL    Narrative    Test performed by:  [facility]  [address] [place] [address]  Deficiency <10.0 ng/mL  Insufficiency 10.0-29.9 ng/mL  Sufficiency 30.0-80.0 ng/mL  Toxicity (possible) >100.0 ng/mL         ASSESSMENT & PLAN:    Primitivo was seen today for follow up, pain and derm problem.    Diagnoses and all orders for this visit:    Discussed vitamin D deficiency.  Encourage patient to start taking vitamin D replacement.  Plan to continue melatonin as this has been effective.    Xerosis cutis  -     Skin Protectants, Misc. (EUCERIN) cream; Apply topically as needed for dry skin or itching    Strain of trapezius muscle, unspecified laterality, initial encounter: Advised conservative we will prescribe capsaicin cream and naproxen to take as needed for pain.  -     capsaicin (ZOSTRIX) 0.025 % external cream; Apply to painful areas on neck three times daily as needed.  -     naproxen (NAPROSYN) 375 MG tablet; Take 1 tablet (375 mg) by mouth 2 times daily as needed for moderate pain      Return to clinic in 2-3 months or sooner if needed.      The patient was actively involved in the decision making process, and all the questions were answered to their satisfaction prior to leaving.       Patient precepted with attending physician,  Dr. Brooke.    Carolina Duarte, DO   PGY-3 [facility]  Pager: [phone]

## 2019-07-25 NOTE — NURSING NOTE
Due to patient being non-English speaking/uses sign language, an  was used for this visit. Only for face-to-face interpretation by an external agency, date and length of interpretation can be found on the scanned worksheet.     name: Earnest Ames  Agency: [employer]  Language: Cele   Telephone number: [phone]  Type of interpretation: Face-to-face, spoken

## 2019-09-03 ENCOUNTER — OFFICE VISIT (OUTPATIENT)
Dept: FAMILY MEDICINE | Facility: CLINIC | Age: 54
End: 2019-09-03
Payer: COMMERCIAL

## 2019-09-03 VITALS
RESPIRATION RATE: 18 BRPM | BODY MASS INDEX: 22.31 KG/M2 | SYSTOLIC BLOOD PRESSURE: 107 MMHG | HEART RATE: 85 BPM | TEMPERATURE: 98.9 F | DIASTOLIC BLOOD PRESSURE: 71 MMHG | OXYGEN SATURATION: 98 % | WEIGHT: 115 LBS

## 2019-09-03 DIAGNOSIS — M25.561 ACUTE PAIN OF RIGHT KNEE: Primary | ICD-10-CM

## 2019-09-03 NOTE — PROGRESS NOTES
S: Primitivo Garcia is a 54 year old female with a PMH of Restless Leg Syndrome, Vitamin D Deficiency, and heart murmor presenting to clinic today for f/u of right knee pain.    -ER at [facility] on 9/1; X-ray of R knee showing degenerative changes.  Patient tells me she woke up on Friday morning and had some aching type pain extending from the mid thigh down to just below her right knee.  She walked around with her daughter for a while, but the pain was quite severe and continued to ache, so she sought treatment.  She had an x-ray of her right knee in the emergency department, which showed some mild osteoarthritic changes, but no fluid collection or fracture.  She denies any injury to the area.  She has never had this problem before, but states that she is starting to notice more aches and pains that she is getting older.  She did get a dose of Toradol in the ER, and she feels like her pain is been much better since going to the emergency room.  It is really not bothering her a whole lot anymore, but she was told to have follow-up with her primary care provider.  She has been using a knee brace and topical tiger balm for pain control.  She has not tried any Tylenol or ibuprofen.  She is quite active and would like some exercises that she can use to help improve the pain.  She denies any fevers or chills.  She has not noticed any swelling in her leg or in her right knee.    ROS:  Constitutional: No fevers or chills.  Musculoskeletal: + Right knee pain.  Integument: No skin rash.  Neuro: No numbness or tingling in the right lower extremity.  Heme/Lymph: No lower extremity edema bilaterally.  Allergy/Immunology: No known allergies.    Patient Active Problem List   Diagnosis     Refugee     Screening examination for pulmonary tuberculosis (TB)     Lipid screening     Restless legs syndrome (RLS)     Vitamin D deficiency     Heart murmur     Xerosis cutis     Acute pain of right knee     Current Outpatient Medications   Medication  Sig Dispense Refill     capsaicin (ZOSTRIX) 0.025 % external cream Apply to painful areas on neck three times daily as needed. 60 g 1     cyclobenzaprine (FLEXERIL) 10 MG tablet Take 1 tablet (10 mg) by mouth nightly as needed for muscle spasms (Patient not taking: Reported on 5/2/2019) 30 tablet 0     melatonin 5 MG tablet Take 1 tablet (5 mg) by mouth nightly as needed for sleep 30 tablet 1     naproxen (NAPROSYN) 375 MG tablet Take 1 tablet (375 mg) by mouth 2 times daily as needed for moderate pain 60 tablet 0     simvastatin (ZOCOR) 20 MG tablet Take 20 mg by mouth daily       Skin Protectants, Misc. (EUCERIN) cream Apply topically as needed for dry skin or itching 454 g 1     vitamin D3 (CHOLECALCIFEROL) 2000 units (50 mcg) tablet Take 1 tablet (2,000 Units) by mouth daily 30 tablet 3     O: /71   Pulse 85   Temp 98.9  F (37.2  C) (Oral)   Resp 18   Wt 52.2 kg (115 lb)   SpO2 98%   BMI 22.31 kg/m     Constitutional:  Well nourished and in no acute distress.  CV:  RRR  - no murmurs noted.   Respiratory:  CTAB, no wheezes or crackles noted, good air entry in the posterior thorax.  Musc/Skeletal: On inspection, there is no abnormality noted in the right knee.  The skin and surrounding soft tissue appears nonedematous and without erythema.  Bilaterally, there are no knee joint effusions appreciated.  Anterior and posterior drawer signs are negative on the right.  Varus and valgus stress of the right knee does not elicit any tenderness.    Integument: No skin rash or edema around the right knee.  Extrem: No lower extremity edema.  The calves are nontender bilaterally.  Neuro: Sensation light touch is intact in the bilateral lower extremities.  Psych: Euthymic.      Assessment and Plan:  Primitivo was seen today for musculoskeletal problem.    Diagnoses and all orders for this visit:    Acute pain of right knee  -Patient presents here for follow-up after she was seen in the emergency room at [facility] on 9/1 for  rather acute onset of right-sided knee pain.  On Friday morning, she woke up with aching knee pain from just above the knee to just below the knee.  She denies any fevers or chills and there is no effusion noted on exam, so I doubt an infectious etiology.  She did have mild amount of osteoarthritis on her knee x-ray in the ER, and given her subjective improvement in pain and discomfort and reassuring examination, I suspect that this is likely pain due to osteoarthritis.  I doubt any traumatic injury, given no precipitating incident.  She is able to bear weight and walk without difficulty, so I doubt a ligamentous tear as well.  She has been using a knee brace, with good results.  I encouraged her to continue using the knee brace and naproxen as needed, which she already has.  We also went through several strengthening exercises she can do at home to strengthen her calf muscles and quadricep muscles.  She will return here if she has worsening pain, fever/chills, or swelling in the knee.    RTC in 2 weeks if no improvement.    The patient was discussed and evaluated with Dr. Joseph MD.    Hemanth Lucia, PGY-3  [facility]  Pager:  [phone]

## 2019-09-03 NOTE — PROGRESS NOTES
Preceptor Attestation:   Patient seen, evaluated and discussed with the resident. I have verified the content of the note, which accurately reflects my assessment of the patient and the plan of care.   Supervising Physician:  Joshua Ruiz MD

## 2019-09-03 NOTE — NURSING NOTE
Due to patient being non-English speaking/uses sign language, an  was used for this visit. Only for face-to-face interpretation by an external agency, date and length of interpretation can be found on the scanned worksheet.     name: Earnest Ames  Agency: [employer]  Language: Cele   Telephone number: [phone]  Type of interpretation: Face-to-face, spoken

## 2019-09-03 NOTE — PATIENT INSTRUCTIONS
Primitivo Garcia,    Thank you very much for coming in to clinic today!  Here are some stretches for you to try at home to help strengthen the knee muscles.  If you have fever, chills, swelling, or worsening pain, come back to see me.  Thank you again!    Sincerely,    Dr. Lucia  Quadruped Hip Abduction (Strength)    These instructions are for your right leg. Switch sides for your left leg.  1. Get down on the floor on your hands and knees.  2. Lift your right leg up and out to the side. Keep the knee bent. Raise your leg as high as is comfortable. Hold for 5 seconds.  3. Slowly lower your leg back to the floor.  4. Repeat 10 times, or as instructed.  Date Last Reviewed: 5/1/2016 2000-2018 [employer]. [address] [place] [address]. All rights reserved. This information is not intended as a substitute for professional medical care. Always follow your healthcare professional's instructions.           Patient Education     Quadriceps, Isometric (Strength)    This exercise is for an injured right knee. Switch sides if the injury is to your left knee.  1. Sit on the floor with your right leg straight in front of you. Bend your left knee up and put your left foot flat on the floor.  2. Flex your right foot and tighten the thigh muscles of your right leg. Press the back of your right knee toward the floor. Don t arch your back or hunch your shoulders.  3. Hold for 5 to 10 seconds. Then relax.  4. Repeat 10 times, or as instructed.  5. Do this exercise 3 times a day, or as instructed.  Date Last Reviewed: 3/10/2016    [id number] [employer] [address] [place] [address]. All rights reserved. This information is not intended as a substitute for professional medical care. Always follow your healthcare professional's instructions.           Patient Education     Quadriceps, Short Arcs (Strength)    1. Sit on the floor with your right leg straight in front of you. Bend your left  knee up and put your left foot flat on the floor.  2. Place a rolled-up towel under your right thigh, just above your knee. Relax your leg.  3. Straighten your right leg, lifting your foot off the floor. Hold for 5 seconds. Then relax.  4. Repeat 10 times, or as instructed.  5. Switch legs and then repeat.     Challenge yourself  Use ankle weights for a tougher workout. Your healthcare provider will tell you what size ankle weights to use.   Date Last Reviewed: 3/10/2016    6254-1659 [employer]. [address] [place] [address]. All rights reserved. This information is not intended as a substitute for professional medical care. Always follow your healthcare professional's instructions.

## 2020-05-07 ENCOUNTER — TELEPHONE (OUTPATIENT)
Dept: FAMILY MEDICINE | Facility: CLINIC | Age: 55
End: 2020-05-07

## 2020-05-07 NOTE — LETTER
May 7, 2020      Primitivo Garcia  [address]  [place] [address]        Dear Primitivo,      We tried reaching you by phone but were unable to connect with you. We are reaching out to see how you are doing. This is a very stressful time in the world, which can cause an increase in personal stress and anxiety.     Our clinic is open.  We are here for you and are ready to meet all of your healthcare needs.  We have delayed preventive care until July.  We want everyone who can to stay home during this time for their health and the health of all.  We are now having most visits over the phone, but will see people in person if your doctor agrees that it is necessary.  We also will have video visits starting on April 6, 2020.      Call us with any questions or concerns you may have, and know that we are all in this together.       Sincerely,     Your team at [facility]  [phone]

## 2020-05-07 NOTE — TELEPHONE ENCOUNTER
Attempt to call 2 times but unable to reach. Letter sent. STEPHEN Ly    Due to patient being non-English speaking/uses sign language, an  was used for this visit. Only for face-to-face interpretation by an external agency, date and length of interpretation can be found on the scanned worksheet.     name: Caroline og  Agency: [employer]  Language: Maria Dolores/Cele   Telephone number: [phone]  Type of interpretation: Telephone, spoken

## 2021-05-31 VITALS — WEIGHT: 110.9 LBS | HEIGHT: 65 IN | BODY MASS INDEX: 18.48 KG/M2

## 2021-06-11 NOTE — ANESTHESIA CARE TRANSFER NOTE
Last vitals:   Vitals:    06/14/17 1116   BP: 153/72   Pulse: 61   Resp: 16   Temp: 36.6  C (97.9  F)   SpO2: 100%     Patient's level of consciousness is drowsy  Spontaneous respirations: yes  Maintains airway independently: yes  Dentition unchanged: yes  Oropharynx: oropharynx clear of all foreign objects    QCDR Measures:  ASA# 20 - Surgical Safety Checklist: ASA20A - Safety Checks Done  PQRS# 430 - Adult PONV Prevention: 4558F - Pt received => 2 anti-emetic agents (different classes) preop & intraop  ASA# 8 - Peds PONV Prevention: NA - Not pediatric patient, not GA or 2 or more risk factors NOT present  PQRS# 424 - Kylie-op Temp Management: 4559F - At least one body temp DOCUMENTED => 35.5C or 95.9F within required timeframe  PQRS# 426 - PACU Transfer Protocol: - Transfer of care checklist used  ASA# 14 - Acute Post-op Pain: ASA14B - Patient did NOT experience pain >= 7 out of 10

## 2021-06-11 NOTE — ANESTHESIA POSTPROCEDURE EVALUATION
Patient: Primitivo Garcia  COLPOCLEISIS, PERINEORRAPHY  Anesthesia type: general    Patient location: PACU  Last vitals:   Vitals:    06/14/17 1440   BP: 125/63   Pulse: 74   Resp: 19   Temp:    SpO2: 93%     Post vital signs: stable  Level of consciousness: awake and responds to simple questions  Post-anesthesia pain: pain controlled  Post-anesthesia nausea and vomiting: no  Pulmonary: unassisted, return to baseline  Cardiovascular: stable and blood pressure at baseline  Hydration: adequate  Anesthetic events: no    QCDR Measures:  ASA# 11 - Kylie-op Cardiac Arrest: ASA11B - Patient did NOT experience unanticipated cardiac arrest  ASA# 12 - Kylie-op Mortality Rate: ASA12B - Patient did NOT die  ASA# 13 - PACU Re-Intubation Rate: ASA13B - Patient did NOT require a new airway mgmt  ASA# 10 - Composite Anes Safety: ASA10A - No serious adverse event  ASA# 38 - New Corneal Injury: ASA38A - No new exposure keratitis or corneal abrasion in PACU    Additional Notes:

## 2021-07-03 NOTE — ANESTHESIA PREPROCEDURE EVALUATION
Anesthesia Preprocedure Evaluation by Pamela De La Rosa MD at 6/14/2017 12:10 PM     Author: Pamela De La Rosa MD Service: -- Author Type: Physician    Filed: 6/14/2017  1:55 PM Date of Service: 6/14/2017 12:10 PM Status: Addendum    : Pamela De La Rosa MD (Physician)    Related Notes: Original Note by Pamela De La Rosa MD (Physician) filed at 6/14/2017 12:11 PM       Anesthesia Evaluation      Patient summary reviewed   No history of anesthetic complications     Airway   Mallampati: II  Neck ROM: full   Pulmonary     breath sounds clear to auscultation                         Cardiovascular - negative ROS  Exercise tolerance: > or = 4 METS  Rhythm: regular        Neuro/Psych - negative ROS     Endo/Other - negative ROS      GI/Hepatic/Renal - negative ROS           Dental    (+) poor dentition                           Anesthesia Plan  Planned anesthetic: general LMA    ASA 2   Induction: intravenous   Anesthetic plan and risks discussed with: patient  Anesthesia plan special considerations: antiemetics,   Post-op plan: routine recovery        Pamela De La Rosa MD  Staff Anesthesiologist  [facility]  6/14/17